# Patient Record
Sex: MALE | Race: WHITE | URBAN - METROPOLITAN AREA
[De-identification: names, ages, dates, MRNs, and addresses within clinical notes are randomized per-mention and may not be internally consistent; named-entity substitution may affect disease eponyms.]

---

## 2021-10-06 ENCOUNTER — OFFICE VISIT (OUTPATIENT)
Dept: URBAN - METROPOLITAN AREA CLINIC 89 | Facility: CLINIC | Age: 71
End: 2021-10-06
Payer: MEDICARE

## 2021-10-06 DIAGNOSIS — H00.011 HORDEOLUM, RIGHT UPPER LID: Primary | ICD-10-CM

## 2021-10-06 PROCEDURE — 99203 OFFICE O/P NEW LOW 30 MIN: CPT | Performed by: OPTOMETRIST

## 2021-10-06 RX ORDER — CEPHALEXIN 500 MG/1
500 MG TABLET ORAL
Qty: 20 | Refills: 0 | Status: INACTIVE
Start: 2021-10-06 | End: 2021-10-15

## 2021-10-06 ASSESSMENT — INTRAOCULAR PRESSURE
OD: 10
OS: 10

## 2021-10-06 NOTE — IMPRESSION/PLAN
Impression: Blepharitis of lt eye, upper and lower eyelids: H01.00B. Plan: Patient was educated regarding blepharitis condition. The patient was advised to start lid scrubs with either baby shampoo or commercially available OTC lid scrub towelettes. Handout provided to patient.

## 2021-10-06 NOTE — IMPRESSION/PLAN
Impression: Blepharitis of right eye, upper and lower eyelids: H01.00A. Plan: Patient was educated regarding blepharitis condition. The patient was advised to start lid scrubs with either baby shampoo or commercially available OTC lid scrub towelettes. Handout provided to patient.

## 2021-10-06 NOTE — IMPRESSION/PLAN
Impression: Hordeolum, right upper lid: H00.011. Plan: Start Warm compresses and digital massage. Handout given. Start omega-3 fatty acids. Start Cephalexin 500 mg PO BID x 10 days.

## 2021-10-25 ENCOUNTER — OFFICE VISIT (OUTPATIENT)
Dept: URBAN - METROPOLITAN AREA CLINIC 89 | Facility: CLINIC | Age: 71
End: 2021-10-25
Payer: MEDICARE

## 2021-10-25 DIAGNOSIS — H25.13 AGE-RELATED NUCLEAR CATARACT, BILATERAL: Primary | ICD-10-CM

## 2021-10-25 DIAGNOSIS — H01.00A BLEPHARITIS OF RIGHT EYE, UPPER AND LOWER EYELIDS: ICD-10-CM

## 2021-10-25 DIAGNOSIS — H01.00B BLEPHARITIS OF LT EYE, UPPER AND LOWER EYELIDS: ICD-10-CM

## 2021-10-25 DIAGNOSIS — H52.223 REGULAR ASTIGMATISM, BILATERAL: ICD-10-CM

## 2021-10-25 PROCEDURE — 92014 COMPRE OPH EXAM EST PT 1/>: CPT | Performed by: OPTOMETRIST

## 2021-10-25 ASSESSMENT — VISUAL ACUITY: OS: 20/25

## 2021-10-25 ASSESSMENT — INTRAOCULAR PRESSURE
OS: 15
OD: 15

## 2021-10-25 NOTE — IMPRESSION/PLAN
Impression: Blepharitis of right eye, upper and lower eyelids: H01.00A. Plan: Patient was educated regarding blepharitis condition. Continue lid scrubs with either baby shampoo or commercially available OTC lid scrub towelettes.

## 2021-10-25 NOTE — IMPRESSION/PLAN
Impression: Blepharitis of right eye, upper and lower eyelids: H01.00A. Plan: Advised patient to start lid scrubs. Handout given.

## 2022-10-26 ENCOUNTER — OFFICE VISIT (OUTPATIENT)
Dept: URBAN - METROPOLITAN AREA CLINIC 89 | Facility: CLINIC | Age: 72
End: 2022-10-26
Payer: MEDICARE

## 2022-10-26 DIAGNOSIS — H02.889 MEIBOMIAN GLAND DYSFUNCTION OF UNSP, UNSPECIFIED EYELID: ICD-10-CM

## 2022-10-26 DIAGNOSIS — H43.811 VITREOUS DEGENERATION, RIGHT EYE: ICD-10-CM

## 2022-10-26 DIAGNOSIS — H25.13 AGE-RELATED NUCLEAR CATARACT, BILATERAL: Primary | ICD-10-CM

## 2022-10-26 DIAGNOSIS — H43.311 VITREOUS STRANDS OF RIGHT EYE: ICD-10-CM

## 2022-10-26 DIAGNOSIS — H11.152 PINGUECULA, LEFT EYE: ICD-10-CM

## 2022-10-26 DIAGNOSIS — H35.372 PUCKERING OF MACULA, LEFT EYE: ICD-10-CM

## 2022-10-26 PROCEDURE — 92014 COMPRE OPH EXAM EST PT 1/>: CPT | Performed by: OPTOMETRIST

## 2022-10-26 PROCEDURE — 92134 CPTRZ OPH DX IMG PST SGM RTA: CPT | Performed by: OPTOMETRIST

## 2022-10-26 RX ORDER — ASPIRIN 81 MG/1
81 MG TABLET, CHEWABLE ORAL
Qty: 0 | Refills: 0 | Status: ACTIVE
Start: 2022-10-26

## 2022-10-26 ASSESSMENT — INTRAOCULAR PRESSURE
OS: 18
OD: 17

## 2022-10-26 ASSESSMENT — KERATOMETRY
OD: 42.40
OS: 41.70

## 2022-10-26 NOTE — IMPRESSION/PLAN
Impression: Meibomian gland dysfunction of unsp, unspecified eyelid: H02.889. Plan: Patient has dry eye due to inadequate lipid layer of tears. Patient is advised to start Systane Balance QID. Patient was educated regarding the rebound drying effect of BERNADINE and therefore Systane Balance is strongly recommended.

## 2022-10-26 NOTE — IMPRESSION/PLAN
Impression: Vitreous strands of right eye: H43.311. Plan: Recommend annual examination. No treatment otherwise recommended at this time.

## 2022-10-26 NOTE — IMPRESSION/PLAN
Impression: Puckering of macula, left eye: H35.372. Plan: Epiretinal membrane mild. OCT macular performed today. Discussion with patient. Will continue to monitor. Patient will RTC if changes in vision, otherwise RTC 6 months for repeat evaluation.

## 2022-10-26 NOTE — IMPRESSION/PLAN
Impression: Vitreous degeneration, right eye: H43.561. Plan: Discussion with patient regarding posterior vitreous detachment and that while it is generally a benign condition, it can be bothersome. Patient advised of signs/symptoms associated with retinal tear/break/detachment and to RTC ASAP.